# Patient Record
Sex: MALE | Race: WHITE
[De-identification: names, ages, dates, MRNs, and addresses within clinical notes are randomized per-mention and may not be internally consistent; named-entity substitution may affect disease eponyms.]

---

## 2018-08-25 ENCOUNTER — HOSPITAL ENCOUNTER (EMERGENCY)
Dept: HOSPITAL 45 - C.EDB | Age: 23
Discharge: HOME | End: 2018-08-25
Payer: COMMERCIAL

## 2018-08-25 VITALS
BODY MASS INDEX: 19.17 KG/M2 | HEIGHT: 69.02 IN | BODY MASS INDEX: 19.17 KG/M2 | WEIGHT: 129.41 LBS | WEIGHT: 129.41 LBS | HEIGHT: 69.02 IN

## 2018-08-25 VITALS — HEART RATE: 54 BPM | OXYGEN SATURATION: 100 % | DIASTOLIC BLOOD PRESSURE: 82 MMHG | SYSTOLIC BLOOD PRESSURE: 131 MMHG

## 2018-08-25 VITALS — TEMPERATURE: 97.7 F

## 2018-08-25 DIAGNOSIS — R11.10: ICD-10-CM

## 2018-08-25 DIAGNOSIS — F41.9: ICD-10-CM

## 2018-08-25 DIAGNOSIS — F12.10: Primary | ICD-10-CM

## 2018-08-25 LAB
CA-I BLD-SCNC: 1.12 MMOL/L (ref 1.12–1.32)
CREAT BLD-MCNC: 1 MG/DL (ref 0.6–1.3)
ISTAT POTASSIUM: 4 MEQ/L (ref 3.3–5)
SODIUM BLD-SCNC: 141 MEQ/L (ref 135–144)

## 2018-08-25 NOTE — EMERGENCY ROOM VISIT NOTE
History


Report prepared by Shiloh:  Sanjay Crawford


Under the Supervision of:  Dr. Arun Arrieta M.D.


First contact with patient:  17:01


Chief Complaint:  HYPERVENTILATION


Stated Complaint:  ANXIETY- VOMITING





History of Present Illness


The patient is a 23 year old male who presents to the Emergency Room with 

complaints of anxiety that is causing him to vomit. This episode started about 

an hour ago while he was on his way to work. He does admit to smoking marijuana 

around the same time the symptoms started. He reports that this has happened 

before but he does not take any medications for his anxiety. He states that he 

has not eaten today and that he has not noticed any blood in his vomit or 

stool.  No coffee-ground emesis.  The patient notes that he does not consume 

alcohol and denies any SI, HI, or auditory hallucinations.





   Source of History:  patient


   Onset:  An hour ago


   Position:  other (Generalized)


   Timing:  other (episodic)


   Associated Symptoms:  + chills, + nausea, + vomiting, No melena, No 

hematochezia





Review of Systems


See HPI for pertinent positives and negatives.  A total of ten systems were 

reviewed and were otherwise negative.





Past Medical & Surgical


Medical Problems:


(1) Anxiety








Family History





Patient reports no known family medical history.





Social History


Smoking Status:  Never Smoker


Alcohol Use:  none


Drug Use:  marijuana


Occupation Status:  student





Current/Historical Medications


No Active Prescriptions or Reported Meds





Allergies


Coded Allergies:  


     No Known Allergies (Unverified , 9/26/12)





Physical Exam


Vital Signs











  Date Time  Temp Pulse Resp B/P (MAP) Pulse Ox O2 Delivery O2 Flow Rate FiO2


 


8/25/18 18:25  54 16 131/82 100   


 


8/25/18 18:03  73  113/64 100 Nasal Cannula 2.0 


 


8/25/18 17:33     100 Room Air  


 


8/25/18 16:58 36.5 86 18  100 Room Air  











Physical Exam


Physical Exam 


GENERAL:  He is oriented to person, place, and time. He appears well-developed 

and well-nourished. He is vomiting.


HENT:  Exam performed. 


   Head:  Normocephalic and atraumatic. 


   Right Ear:  External ear normal. No mastoid tenderness. 


   Left Ear: External ear normal. No mastoid tenderness. 


   Mouth/Throat:  The oropharynx is clear and moist. No trismus in the jaw. No 

dental abscesses or uvula swelling. No oropharyngeal exudate or tonsillar 

abscesses.


EYES: Conjunctivae and EOM are normal. Pupils are equal, round, and reactive to 

light. Right eye exhibits no discharge. Left eye exhibits no discharge. No 

scleral icterus.


NECK: Normal range of motion. Neck supple. No JVD present. No spinous process 

tenderness present. No carotid bruit present. No rigidity. No tracheal 

deviation and normal range of motion present. No Brudzinski's sign and no Kernig

's sign noted.


CV: Normal rate, regular rhythm, normal heart sounds and intact distal pulses. 

There is no peripheral edema. Palpable radial pulses bue.


PULM/CHEST:  Effort normal and breath sounds normal. No respiratory distress. 

No stridor. He has no wheezes. He has no rales. 


   Chest Wall:  He exhibits no tenderness.


ABD: The abdomen is soft. Bowel sounds are normal. He has no distension. No 

mass is present. There is no tenderness. There is no rebound, no guarding, no 

Kaiser's sign and no tenderness at McBurney's point. Rovsig negative.


MUSC/SKEL: Normal range of motion. There is no peripheral edema, tenderness or 

deformity.


LYMPH: No cervical adenopathy.


NEURO: He is alert and oriented to person, place, and time. He has normal 

strength. No cranial nerve deficit or sensory deficit. Coordination and gait 

normal. GCS eye subscore is 4. GCS verbal subscore is 5. GCS motor subscore is 

6. Cerebellar tests wnl.


SKIN: Skin is warm and dry. He is not diaphoretic.


PSYCH: Anxious appearing. Behavior is normal. Judgment and thought content 

normal.





Medical Decision & Procedures


Laboratory Results











Test


  8/25/18


17:33


 


Bedside Hemoglobin


  16.3 g/dl


(14.0-18.0)


 


Bedside Hematocrit 48 % (42-52) 


 


Bedside Sodium


  141 mEq/L


(135-144)


 


Bedside Potassium


  4.0 mEq/L


(3.3-5.0)


 


Bedside Chloride


  104 mEq/L


(101-112)


 


Bedside Total CO2


  21 mEq/l


(24-31)


 


Anion Gap


  21.0 mmol/L


(16-25)


 


Bedside Blood Urea Nitrogen


  15 mg/dl


(7-18)


 


Bedside Creatinine


  1.0 mg/dl


(0.6-1.3)


 


Bedside Glucose (other)


  139 mg/dl


(70-99)


 


Bedside Ionized Calcium (Kash)


  1.12 mmol/l


(1.12-1.32)





Laboratory results reviewed by me





Medications Administered











 Medications


  (Trade)  Dose


 Ordered  Sig/Jacinda


 Route  Start Time


 Stop Time Status Last Admin


Dose Admin


 


 Sodium Chloride  1,000 ml @ 


 999 mls/hr  Q1H1M STAT


 IV  8/25/18 17:06


 8/25/18 18:06 DC 8/25/18 17:32


999 MLS/HR


 


 Ondansetron HCl


  (Zofran Inj)  4 mg  NOW  STAT


 IV  8/25/18 17:06


 8/25/18 17:07 DC 8/25/18 17:32


4 MG


 


 Lorazepam


  (Ativan Inj)  1 mg  NOW  STAT


 IV  8/25/18 17:06


 8/25/18 17:07 DC 8/25/18 17:32


1 MG











ED Course


1702: The patient was evaluated in room A9. A complete history and physical 

exam was performed.





1706: Ativan 1mg IV, Zofran 4mg IV, Sodium Chloride 1000ml @ 999mls/hr IV





1801: Vital signs stable.  Labs within normal limits.  Patient no longer 

vomiting status post medications.  He was advised to stop smoking marijuana.  

Mother at bedside states she will drive home.  Mother at bedside asked if the 

patient could have prescription for Xanax, I explained to her that should have 

to follow-up with a PCP for this prescription.DISCHARGE - Plan of care 

discussed with patient and questions answered. The patient was given both 

verbal and printed discharge instructions. The patient verbalized understanding 

and ability to comply. The patient is to seek outpatient follow up as noted in 

the discharge instructions. The patient verbalized understanding and ability to 

comply. The patient is discharged in stable condition. The patient was 

instructed to return for worsening symptoms.





Medical Decision


Vital signs stable.  Labs within normal limits.  Patient no longer vomiting 

status post medications.  He was advised to stop smoking marijuana.  Mother at 

bedside states she will drive home.  Mother at bedside asked if the patient 

could have prescription for Xanax, I explained to her that should have to follow

-up with a PCP for this prescription.DISCHARGE - Plan of care discussed with 

patient and questions answered. The patient was given both verbal and printed 

discharge instructions. The patient verbalized understanding and ability to 

comply. The patient is to seek outpatient follow up as noted in the discharge 

instructions. The patient verbalized understanding and ability to comply. The 

patient is discharged in stable condition. The patient was instructed to return 

for worsening symptoms.





Medication Reconcilliation


Current Medication List:  was personally reviewed by me





Blood Pressure Screening


Patient's blood pressure:  Normal blood pressure





Impression





 Primary Impression:  


 Marijuana abuse


 Additional Impressions:  


 Vomiting


 Anxiety





Scribe Attestation


The scribe's documentation has been prepared under my direction and personally 

reviewed by me in its entirety. I confirm that the note above accurately 

reflects all work, treatment, procedures, and medical decision making performed 

by me.





The chart was completed utilizing Dragon Speech voice recognition software. 

Grammatical errors, random word insertions, pronoun errors, and incomplete 

sentences are an occasional consequence of this system due to software 

limitations, ambient noise, and hardware issues. Any formal questions or 

concerns about the content, text, or information contained within the body of 

this dictation should be directly addressed to the physician for clarification.





Departure Information


Dispostion


Home / Self-Care





Prescriptions





No Active Prescriptions or Reported Meds





Referrals


Tayler Pichardo D.O. (PCP)





Forms


HOME CARE DOCUMENTATION FORM,                                                 

               IMPORTANT VISIT INFORMATION, WORK / SCHOOL INSTRUCTIONS





Patient Instructions


My [facility]





Additional Instructions





Stop smoking marijuana.





Problem Qualifiers








 Additional Impressions:  


 Vomiting


 Vomiting type:  unspecified  Vomiting Intractability:  unspecified  Nausea 

presence:  unspecified  Qualified Codes:  R11.10 - Vomiting, unspecified